# Patient Record
(demographics unavailable — no encounter records)

---

## 2025-04-24 NOTE — PHYSICAL EXAM
[de-identified] : Constitutional:  38 year old female, alert and oriented, cooperative, in no acute distress.  HEENT  NC/AT.  Appearance: symmetric  Neck/Back Straight without deformity or instability.  Good ROM.  Chest/Respiratory  Respiratory effort: no intercostal retractions or use of accessory muscles. Nonlabored Breathing  Skin  On inspection, warm and dry without rashes or lesions.  Mental Status:  Judgment, insight: intact Orientation: oriented to time, place, and person  Neurological: Sensory and Motor are grossly intact throughout  Left Knee  Inspection:     Skin intact, no rashes or lesions     No Effusion     Non-tender to palpation over tibial tubercle, patella, medial and lateral joint line, and pes insertion.  Range of Motion: 	Extension - 0 degrees 	Flexion - 120 degrees 	Extensor lag: None  Stability:      Demonstrates no Varus or Valgus instability      Negative Anterior or Posterior drawer.      Negative Lachman's      Negative McMurrays  Patella: stable, tracks well.   Neurologic Exam     Motor intact including 5/5 Extensor Hallucis Longus, 5/5 Flexor Hallucis Longus, 5/5 Tibialis Anterior and 5/5 Gastrocnemius     Sensation Intact to Light Touch including Saphenous, Sural, Superficial Peroneal, Deep Peroneal, Tibial nerve distributions  Vascular Exam     Foot is warm and well perfused with 2+ Dorsalis Pedis Pulse   No pain with range of motion of the bilateral hips or right knee. No lumbar paraspinal muscle tenderness. [de-identified] : XRay:  XRays of the Left Knee (4 Views) taken in the office today and discussed with the patient. XRays demonstrate no obvious fracture or dislocation. There is no significant evidence of osteoarthritis or osteophyte formation. (my personal interpretation).

## 2025-04-24 NOTE — DISCUSSION/SUMMARY
[de-identified] : Arianna Katz is a 38-year-old female who presents to the office for evaluation of her left knee pain.  X-ray showed no obvious fractures or dislocations.  Examination showed good left knee range of motion. Discussed with the patient the examination and imaging findings.  Discussed with the patient the management of patient's patellofemoral pain at this time, including physical therapy and anti-inflammatories.  Patient was given referral for physical therapy.  Patient will take over-the-counter anti-inflammatories as needed for pain control.  Patient will follow-up in 2 months for reevaluation and management.  Patient understanding and in agreement the plan.  All questions answered   Plan: -Physical Therapy -Over-the-counter anti-inflammatories as needed for pain control -Follow up in 2 months for reevaluation and management

## 2025-04-24 NOTE — PHYSICAL EXAM
[de-identified] : Constitutional:  38 year old female, alert and oriented, cooperative, in no acute distress.  HEENT  NC/AT.  Appearance: symmetric  Neck/Back Straight without deformity or instability.  Good ROM.  Chest/Respiratory  Respiratory effort: no intercostal retractions or use of accessory muscles. Nonlabored Breathing  Skin  On inspection, warm and dry without rashes or lesions.  Mental Status:  Judgment, insight: intact Orientation: oriented to time, place, and person  Neurological: Sensory and Motor are grossly intact throughout  Left Knee  Inspection:     Skin intact, no rashes or lesions     No Effusion     Non-tender to palpation over tibial tubercle, patella, medial and lateral joint line, and pes insertion.  Range of Motion: 	Extension - 0 degrees 	Flexion - 120 degrees 	Extensor lag: None  Stability:      Demonstrates no Varus or Valgus instability      Negative Anterior or Posterior drawer.      Negative Lachman's      Negative McMurrays  Patella: stable, tracks well.   Neurologic Exam     Motor intact including 5/5 Extensor Hallucis Longus, 5/5 Flexor Hallucis Longus, 5/5 Tibialis Anterior and 5/5 Gastrocnemius     Sensation Intact to Light Touch including Saphenous, Sural, Superficial Peroneal, Deep Peroneal, Tibial nerve distributions  Vascular Exam     Foot is warm and well perfused with 2+ Dorsalis Pedis Pulse   No pain with range of motion of the bilateral hips or right knee. No lumbar paraspinal muscle tenderness. [de-identified] : XRay:  XRays of the Left Knee (4 Views) taken in the office today and discussed with the patient. XRays demonstrate no obvious fracture or dislocation. There is no significant evidence of osteoarthritis or osteophyte formation. (my personal interpretation).

## 2025-04-24 NOTE — HISTORY OF PRESENT ILLNESS
[de-identified] : Arianna Katz is a 30-year-old female presents to the office for evaluation of her left knee pain.  Patient has a history of left knee pain.  Pain is located over the anterior knee and she can feel possible patellar instability.  She also had some calf pain and went to the ER.  An ultrasound was negative.  Patient has tried Advil, some relief.  She has not tried physical therapy or injections.  No falls.  History: Depression, Anxiety, HTN, IBS

## 2025-04-24 NOTE — HISTORY OF PRESENT ILLNESS
[de-identified] : Arianna Katz is a 30-year-old female presents to the office for evaluation of her left knee pain.  Patient has a history of left knee pain.  Pain is located over the anterior knee and she can feel possible patellar instability.  She also had some calf pain and went to the ER.  An ultrasound was negative.  Patient has tried Advil, some relief.  She has not tried physical therapy or injections.  No falls.  History: Depression, Anxiety, HTN, IBS

## 2025-04-24 NOTE — DISCUSSION/SUMMARY
[de-identified] : Arianna Katz is a 38-year-old female who presents to the office for evaluation of her left knee pain.  X-ray showed no obvious fractures or dislocations.  Examination showed good left knee range of motion. Discussed with the patient the examination and imaging findings.  Discussed with the patient the management of patient's patellofemoral pain at this time, including physical therapy and anti-inflammatories.  Patient was given referral for physical therapy.  Patient will take over-the-counter anti-inflammatories as needed for pain control.  Patient will follow-up in 2 months for reevaluation and management.  Patient understanding and in agreement the plan.  All questions answered   Plan: -Physical Therapy -Over-the-counter anti-inflammatories as needed for pain control -Follow up in 2 months for reevaluation and management

## 2025-06-26 NOTE — DISCUSSION/SUMMARY
[de-identified] : Arianna Katz is a 38-year-old female who presents to the office for evaluation of her left knee pain.  X-ray showed no obvious fractures or dislocations.  Examination showed good left knee range of motion. Discussed with the patient the examination and imaging findings.  Discussed with the patient the management of patient's patellofemoral pain at this time, including physical therapy and anti-inflammatories.  Patient was given referral for physical therapy.  Patient will take over-the-counter anti-inflammatories as needed for pain control.  Patient will follow-up in 2 months for reevaluation and management.  Patient understanding and in agreement the plan.  All questions answered   Plan: -Physical Therapy -Over-the-counter anti-inflammatories as needed for pain control -Follow up in 2 months for reevaluation and management

## 2025-06-26 NOTE — HISTORY OF PRESENT ILLNESS
[de-identified] : 6/26/2025 4/24/2025 Arianna Katz is a 30-year-old female presents to the office for evaluation of her left knee pain.  Patient has a history of left knee pain.  Pain is located over the anterior knee and she can feel possible patellar instability.  She also had some calf pain and went to the ER.  An ultrasound was negative.  Patient has tried Advil, some relief.  She has not tried physical therapy or injections.  No falls.  History: Depression, Anxiety, HTN, IBS

## 2025-06-26 NOTE — PHYSICAL EXAM
[de-identified] : Constitutional:  38 year old female, alert and oriented, cooperative, in no acute distress.  HEENT  NC/AT.  Appearance: symmetric  Neck/Back Straight without deformity or instability.  Good ROM.  Chest/Respiratory  Respiratory effort: no intercostal retractions or use of accessory muscles. Nonlabored Breathing  Skin  On inspection, warm and dry without rashes or lesions.  Mental Status:  Judgment, insight: intact Orientation: oriented to time, place, and person  Neurological: Sensory and Motor are grossly intact throughout  Left Knee  Inspection:     Skin intact, no rashes or lesions     No Effusion     Non-tender to palpation over tibial tubercle, patella, medial and lateral joint line, and pes insertion.  Range of Motion: 	Extension - 0 degrees 	Flexion - 120 degrees 	Extensor lag: None  Stability:      Demonstrates no Varus or Valgus instability      Negative Anterior or Posterior drawer.      Negative Lachman's      Negative McMurrays  Patella: stable, tracks well.   Neurologic Exam     Motor intact including 5/5 Extensor Hallucis Longus, 5/5 Flexor Hallucis Longus, 5/5 Tibialis Anterior and 5/5 Gastrocnemius     Sensation Intact to Light Touch including Saphenous, Sural, Superficial Peroneal, Deep Peroneal, Tibial nerve distributions  Vascular Exam     Foot is warm and well perfused with 2+ Dorsalis Pedis Pulse   No pain with range of motion of the bilateral hips or right knee. No lumbar paraspinal muscle tenderness. [de-identified] : XRay:  XRays of the Left Knee (4 Views) taken on 4/24/2025. XRays demonstrate no obvious fracture or dislocation. There is no significant evidence of osteoarthritis or osteophyte formation. (my personal interpretation).